# Patient Record
Sex: FEMALE | Race: WHITE | NOT HISPANIC OR LATINO | Employment: OTHER | URBAN - METROPOLITAN AREA
[De-identification: names, ages, dates, MRNs, and addresses within clinical notes are randomized per-mention and may not be internally consistent; named-entity substitution may affect disease eponyms.]

---

## 2017-09-25 ENCOUNTER — ESTABLISHED COMPREHENSIVE EXAM (OUTPATIENT)
Dept: URBAN - METROPOLITAN AREA CLINIC 40 | Facility: CLINIC | Age: 52
End: 2017-09-25

## 2017-09-25 DIAGNOSIS — H52.4: ICD-10-CM

## 2017-09-25 PROCEDURE — 92014 COMPRE OPH EXAM EST PT 1/>: CPT

## 2017-09-25 PROCEDURE — 92015 DETERMINE REFRACTIVE STATE: CPT

## 2017-09-25 ASSESSMENT — VISUAL ACUITY
OD_SC: 20/100
OS_SC: 20/100
OD_CC: 20/25-2
OS_CC: J3
OD_CC: J3
OS_CC: 20/25

## 2017-09-25 ASSESSMENT — TONOMETRY
OS_IOP_MMHG: 18
OD_IOP_MMHG: 18

## 2018-10-24 ENCOUNTER — ESTABLISHED COMPREHENSIVE EXAM (OUTPATIENT)
Dept: URBAN - METROPOLITAN AREA CLINIC 40 | Facility: CLINIC | Age: 53
End: 2018-10-24

## 2018-10-24 DIAGNOSIS — H52.4: ICD-10-CM

## 2018-10-24 DIAGNOSIS — H52.201: ICD-10-CM

## 2018-10-24 DIAGNOSIS — H52.03: ICD-10-CM

## 2018-10-24 PROCEDURE — 92014 COMPRE OPH EXAM EST PT 1/>: CPT

## 2018-10-24 PROCEDURE — 92015 DETERMINE REFRACTIVE STATE: CPT

## 2018-10-24 ASSESSMENT — VISUAL ACUITY
OD_CC: J2-
OS_CC: 20/40+2
OD_CC: 20/25-1
OU_CC: 20/25-1
OU_CC: J2+
OS_CC: J3

## 2018-10-24 ASSESSMENT — TONOMETRY
OS_IOP_MMHG: 18
OD_IOP_MMHG: 20

## 2018-10-24 ASSESSMENT — KERATOMETRY
OD_K2POWER_DIOPTERS: 44.25
OS_AXISANGLE_DEGREES: 150
OS_AXISANGLE2_DEGREES: 060
OD_AXISANGLE_DEGREES: 143
OD_K1POWER_DIOPTERS: 45.25
OD_AXISANGLE2_DEGREES: 053
OS_K2POWER_DIOPTERS: 44.75
OS_K1POWER_DIOPTERS: 45.25

## 2019-12-17 ENCOUNTER — ESTABLISHED COMPREHENSIVE EXAM (OUTPATIENT)
Dept: URBAN - METROPOLITAN AREA CLINIC 40 | Facility: CLINIC | Age: 54
End: 2019-12-17

## 2019-12-17 DIAGNOSIS — H52.201: ICD-10-CM

## 2019-12-17 DIAGNOSIS — H52.03: ICD-10-CM

## 2019-12-17 DIAGNOSIS — H52.4: ICD-10-CM

## 2019-12-17 PROCEDURE — 92499OPNC OPTOMAP RETINAL SCREENING NO CHARGE

## 2019-12-17 PROCEDURE — 92015 DETERMINE REFRACTIVE STATE: CPT

## 2019-12-17 PROCEDURE — 92014 COMPRE OPH EXAM EST PT 1/>: CPT

## 2019-12-17 ASSESSMENT — KERATOMETRY
OD_K1POWER_DIOPTERS: 46
OD_AXISANGLE_DEGREES: 147
OD_AXISANGLE2_DEGREES: 57
OS_K2POWER_DIOPTERS: 44.75
OS_K2POWER_DIOPTERS: 44.5
OD_K2POWER_DIOPTERS: 44.25
OS_AXISANGLE2_DEGREES: 060
OD_AXISANGLE2_DEGREES: 053
OS_AXISANGLE2_DEGREES: 90
OS_K1POWER_DIOPTERS: 45.25
OS_K1POWER_DIOPTERS: 44.75
OD_K1POWER_DIOPTERS: 45.25
OS_AXISANGLE_DEGREES: 150
OD_K2POWER_DIOPTERS: 44.75
OS_AXISANGLE_DEGREES: 180
OD_AXISANGLE_DEGREES: 143

## 2019-12-17 ASSESSMENT — VISUAL ACUITY
OD_CC: 20/25
OS_CC: 20/25
OU_CC: J1+
OS_CC: J2
OD_CC: J1
OU_CC: 20/20

## 2019-12-17 ASSESSMENT — TONOMETRY
OD_IOP_MMHG: 18
OS_IOP_MMHG: 18

## 2020-01-08 NOTE — PATIENT DISCUSSION
1.  Zoster ophthalmicus: The disease was discussed with the patient including the risk of contagion until the lesions have crusted and the need to avoid pregnant females who have not had chickenpox. Add (or continue) Valtrex 1gm po TID  Artificial tears prn for comfort. Monitor closely. 2. Zoster keratitis: Improving continue PRED 3x/d decrease to 2x/d on Monday can dc atropine on Monday3. Return for an appointment in 1 week for office call. with Dr. Jhonny Villanueva.

## 2020-01-16 NOTE — PATIENT DISCUSSION
1.  Zoster ophthalmicus: The disease was discussed with the patient including the risk of contagion until the lesions have crusted and the need to avoid pregnant females who have not had chickenpox. Artificial tears prn for comfort. Antibiotic ambrosio to skin lesions. Risk of inflammatory disease discussed. Monitor closely. 2. Zoster keratitis: Restart Acyclovir 400 mg 5x/d and start Viroptic 5 times Keep Scheduled appt w/ FEP if better can wait until Monday.

## 2020-01-17 NOTE — PATIENT DISCUSSION
Zoster keratitis: Continue trifluridine q2 WA for 4 days then decrease then 3x/d for a total of 7 days off steroid for nowSevere neuralgia still discomfort with gabapentin. Continue Acyclovir 400 mg 2x/dAdd Norco for 3 days. Return for an appointment in 1 week for office call. with Dr. Conrado Ruiz.

## 2020-01-21 NOTE — PATIENT DISCUSSION
Zoster keratitis: Improving on Trifluridine but some toxicity from drops. Decrease to 2x/d for 1 day then stop.h/o HSV  feels cold sore in mouth and nose. ?resistance to Acyclovir Change to Famciclovir 500 mg 3x/d for a week then qd. Some inflammation restart PRED qd. Severe postherpetic neuralgia consult pain specialist.Return for an appointment in 1 week for office call. with Dr. Betsy Faria.

## 2020-01-27 NOTE — PATIENT DISCUSSION
Zoster keratitis: Better on Famciclovir surface improving No active dendritic infection inflammation resolving PRED 2x/d tears frequent pain medication severe postherpetic neuralgia seeing pain specialist next week

## 2020-02-05 NOTE — PATIENT DISCUSSION
1.  Zoster keratitis: Inflammatory with no evidence of active viral lesions. Increase PRED to 3x/d decrease famciclovir to qd. Post herpetic neuralgia moderate recommend evaluation with pain specialist.2.  WC massage3. Return for an appointment in 2 weeks for office call. with Dr. Vania Luciano.

## 2020-02-19 NOTE — PATIENT DISCUSSION
1.  Zoster keratitis: still active Tearing diluting the effectiveness of drops. INcrease PRED to 4x/d famciclovir qd. Pt seeing pain specialist for post herpetic neuralgia. 2. Blepharitis posterior type - The patient exhibits inspissated meibomian glands. Warm compresses lid massage and lid scrubs were recommended. oral Doxycycline 50mg qd omega three/flaxseed oil supplements3. Return for an appointment in 1 month for office call. with Dr. Blil Howard. 4.  Facial Rosacea - Patient found to have skin changes consistsnt with Rosacea. No sign of ocular complications related to rosacea at this time. Consider evaluation by dermatology.

## 2020-02-19 NOTE — PATIENT DISCUSSION
Blepharitis posterior type - The patient exhibits inspissated meibomian glands. Warm compresses lid massage and lid scrubs were recommended.   oral Doxycycline 50mg qd omega three/flaxseed oil supplements

## 2020-03-18 NOTE — PATIENT DISCUSSION
Blepharitis posterior type - The patient exhibits inspissated meibomian glands. Warm compresses lid massage and lid scrubs were recommended. Antibiotic ambrosio oral Doxycycline 50mg qd omega three/flaxseed oil supplementsReturn for an appointment in 1 month for office call. with Dr. Camilla Crigler.

## 2020-03-18 NOTE — PATIENT DISCUSSION
Chalazion right lower eyelid - treatment with warm compresses and massage was recommended. Option of I& D discussed.   Schedule if desired Dr Dee Dee Guillory

## 2020-03-18 NOTE — PATIENT DISCUSSION
1.  Zoster keratitis: Quiet on PRED 4x/d slow taper a drop a month to prevent flare. PRED 3x/d2. Chalazion right lower eyelid - treatment with warm compresses and massage was recommended. Option of I& D discussed. Schedule if desired Dr Anayeli Swift. Blepharitis posterior type - The patient exhibits inspissated meibomian glands. Warm compresses lid massage and lid scrubs were recommended. Antibiotic ambrosio oral Doxycycline 50mg qd omega three/flaxseed oil supplementsReturn for an appointment in 1 month for office call. with Dr. Vania Luciano.

## 2020-04-29 NOTE — PATIENT DISCUSSION
1.  Zoster keratitis: Pt tapered off PRED too quickly restart 2x/d for a month qd for a month2. Chalazion right lower eyelid - treatment with warm compresses and massage was recommended. Option of I& D discussed. Schedule if desired Dr Bobby Huitron. Blepharitis posterior type - The patient exhibits inspissated meibomian glands. Warm compresses lid massage and lid scrubs were recommended. Antibiotic ambrosio oral Doxycycline 50mg qd omega three/flaxseed oil supplementsReturn for an appointment in 6 weeks for office call. with Dr. Theresa Segura.

## 2020-12-21 ENCOUNTER — ESTABLISHED COMPREHENSIVE EXAM (OUTPATIENT)
Dept: URBAN - METROPOLITAN AREA CLINIC 40 | Facility: CLINIC | Age: 55
End: 2020-12-21

## 2020-12-21 DIAGNOSIS — H52.4: ICD-10-CM

## 2020-12-21 DIAGNOSIS — H52.03: ICD-10-CM

## 2020-12-21 DIAGNOSIS — H52.201: ICD-10-CM

## 2020-12-21 PROCEDURE — 92014 COMPRE OPH EXAM EST PT 1/>: CPT

## 2020-12-21 PROCEDURE — 92015 DETERMINE REFRACTIVE STATE: CPT

## 2020-12-21 ASSESSMENT — VISUAL ACUITY
OS_CC: J3
OS_SC: J10
OS_CC: 20/30
OU_CC: 20/25-2
OD_SC: J10
OD_SC: 20/100
OD_CC: 20/30+1
OD_CC: J1
OS_SC: 20/100

## 2020-12-21 ASSESSMENT — KERATOMETRY
OS_AXISANGLE2_DEGREES: 060
OS_AXISANGLE_DEGREES: 150
OD_K2POWER_DIOPTERS: 44.25
OD_AXISANGLE2_DEGREES: 053
OD_AXISANGLE_DEGREES: 143
OD_K2POWER_DIOPTERS: 44.75
OS_AXISANGLE_DEGREES: 180
OS_K2POWER_DIOPTERS: 44.5
OS_K1POWER_DIOPTERS: 44.75
OD_AXISANGLE2_DEGREES: 57
OS_K1POWER_DIOPTERS: 45.25
OS_AXISANGLE2_DEGREES: 90
OS_K2POWER_DIOPTERS: 44.75
OD_K1POWER_DIOPTERS: 46
OD_K1POWER_DIOPTERS: 45.25
OD_AXISANGLE_DEGREES: 147

## 2020-12-21 ASSESSMENT — TONOMETRY
OD_IOP_MMHG: 17
OS_IOP_MMHG: 17

## 2021-02-19 NOTE — PATIENT DISCUSSION
Central Corneal Scar OD - from zosterReturn for an appointment for office call. 3-4 weeks with Dr. Kumar Vanessa.

## 2021-02-19 NOTE — PATIENT DISCUSSION
1.  Zoster keratitis: Pt non-compliant with drops and follow-up  Scarring and loss of BSCVA. Restart PRED 4x/d. Pt understands risk of permanent loss of vision if he does not follow instructionsPost herpetic neuralgia severe now seeing neurologist pain specialists unable to help further2. S/P  Lasik:3. Central Corneal Scar OD - from zosterReturn for an appointment for office call. 3-4 weeks with Dr. Jahaira Carlos.

## 2021-03-12 NOTE — PATIENT DISCUSSION
1.  Zoster keratitis: Improving slow taper of drops to prevent flare PRED 3x/d for a month then 2x/d watch IOP risk of steroid response discussed2. S/P  Lasik:3. Return for an appointment in 6 weeks for office call. with Dr. Karen Randle.

## 2021-04-23 NOTE — PATIENT DISCUSSION
1.   Zoster keratitis: Improving slow taper of drops to prevent flare PRED  2x/d for a month then qd DO NOT STOP watch IOP risk of steroid response discussed2. S/P  Lasik:3. Dry Eyes OU:  Start artificials tears. Encouraged regular use. Return for an appointment in 2 months for office call. with Dr. Concetta Bemran.

## 2021-08-20 NOTE — PATIENT DISCUSSION
1.  Zoster keratitis: stable - continue pred QD OD DO NOT STOP has flared off steroid. If flares call and increase drops2. Dry Eye OU:  Continue current management with Artificial Tears. 3.  Return for an appointment in 4 months for pressure check. with Dr. Ruth Gardner.

## 2021-12-17 NOTE — PATIENT DISCUSSION
Zoster keratitis: stable and quiet on PRED qd CPMReturn for an appointment in 4 months for office call. with Dr. Vania Luciano.

## 2021-12-17 NOTE — PATIENT DISCUSSION
1. Keratoconjunctivitis Sicca OD:  Worse seasonal allergies are flaring Increase PF tears gel. t/c regernereyes if not better. Not a candidate for restasis or xiidra. 2.  Zoster keratitis: stable and quiet on PRED qd CPMReturn for an appointment in 4 months for office call. with Dr. Mela Hayden.

## 2022-01-09 ASSESSMENT — KERATOMETRY
OD_K1POWER_DIOPTERS: 45.25
OS_AXISANGLE2_DEGREES: 060
OD_AXISANGLE_DEGREES: 147
OS_K2POWER_DIOPTERS: 44.75
OS_AXISANGLE2_DEGREES: 90
OD_K2POWER_DIOPTERS: 44.75
OD_K2POWER_DIOPTERS: 44.25
OS_AXISANGLE_DEGREES: 180
OD_AXISANGLE_DEGREES: 143
OD_K1POWER_DIOPTERS: 46
OS_K2POWER_DIOPTERS: 44.5
OD_AXISANGLE2_DEGREES: 57
OD_AXISANGLE2_DEGREES: 053
OS_AXISANGLE_DEGREES: 150
OS_K1POWER_DIOPTERS: 45.25
OS_K1POWER_DIOPTERS: 44.75

## 2022-01-10 ENCOUNTER — COMPREHENSIVE EXAM (OUTPATIENT)
Dept: URBAN - METROPOLITAN AREA CLINIC 40 | Facility: CLINIC | Age: 57
End: 2022-01-10

## 2022-01-10 DIAGNOSIS — H52.4: ICD-10-CM

## 2022-01-10 DIAGNOSIS — H52.03: ICD-10-CM

## 2022-01-10 DIAGNOSIS — H52.201: ICD-10-CM

## 2022-01-10 PROCEDURE — 92014 COMPRE OPH EXAM EST PT 1/>: CPT

## 2022-01-10 PROCEDURE — 92015 DETERMINE REFRACTIVE STATE: CPT

## 2022-01-10 ASSESSMENT — VISUAL ACUITY
OD_CC: 20/40+2
OD_SC: 20/100
OS_CC: J2
OS_SC: J10
OS_CC: 20/40
OD_CC: J2
OS_SC: 20/100
OD_SC: J10

## 2022-01-10 ASSESSMENT — TONOMETRY
OD_IOP_MMHG: 23
OS_IOP_MMHG: 19

## 2022-04-13 NOTE — PATIENT DISCUSSION
1. Keratoconjunctivitis Sicca OD:  Worse seasonal allergies are flaring Increase PF tears gel. t/c regernereyes if not better. Not a candidate for restasis or xiidra. 2.  Zoster keratitis: stable and quiet on PRED qd CPMReturn for an appointment in 4 months for office call. with Dr. Ruth Gardner.

## 2022-04-13 NOTE — PATIENT DISCUSSION
Zoster keratitis: stable and quiet on PRED qd CPMReturn for an appointment in 4 months for office call. with Dr. Jhonny Villanueva.

## 2023-01-23 ENCOUNTER — COMPREHENSIVE EXAM (OUTPATIENT)
Dept: URBAN - METROPOLITAN AREA CLINIC 40 | Facility: CLINIC | Age: 58
End: 2023-01-23

## 2023-01-23 DIAGNOSIS — H52.4: ICD-10-CM

## 2023-01-23 DIAGNOSIS — H52.201: ICD-10-CM

## 2023-01-23 DIAGNOSIS — H52.03: ICD-10-CM

## 2023-01-23 PROCEDURE — 92015 DETERMINE REFRACTIVE STATE: CPT

## 2023-01-23 PROCEDURE — 92014 COMPRE OPH EXAM EST PT 1/>: CPT

## 2023-01-23 ASSESSMENT — KERATOMETRY
OS_K1POWER_DIOPTERS: 44.75
OS_K2POWER_DIOPTERS: 44.75
OD_K1POWER_DIOPTERS: 45.25
OD_K1POWER_DIOPTERS: 46
OS_AXISANGLE_DEGREES: 150
OD_AXISANGLE_DEGREES: 147
OS_K2POWER_DIOPTERS: 44.5
OD_AXISANGLE_DEGREES: 143
OS_AXISANGLE2_DEGREES: 060
OS_AXISANGLE_DEGREES: 180
OD_K2POWER_DIOPTERS: 44.75
OD_K2POWER_DIOPTERS: 44.25
OD_AXISANGLE2_DEGREES: 57
OS_K1POWER_DIOPTERS: 45.25
OD_AXISANGLE2_DEGREES: 053
OS_AXISANGLE2_DEGREES: 90

## 2023-01-23 ASSESSMENT — VISUAL ACUITY
OU_CC: 20/20-2
OU_SC: 20/80
OD_SC: 20/100
OS_CC: 20/25
OU_SC: <J10
OU_CC: J1
OS_SC: <J10
OS_SC: 20/100
OD_CC: 20/25
OS_CC: J1
OD_CC: J1
OD_SC: <J10

## 2023-01-23 ASSESSMENT — TONOMETRY
OS_IOP_MMHG: 21
OD_IOP_MMHG: 23

## 2023-01-24 ENCOUNTER — FOLLOW UP (OUTPATIENT)
Dept: URBAN - METROPOLITAN AREA CLINIC 39 | Facility: CLINIC | Age: 58
End: 2023-01-24

## 2023-01-24 DIAGNOSIS — H52.03: ICD-10-CM

## 2023-01-24 DIAGNOSIS — H52.4: ICD-10-CM

## 2023-01-24 DIAGNOSIS — H52.201: ICD-10-CM

## 2023-01-24 PROCEDURE — 92015GRNC REFRACTION GLASSES RECHECK - NO CHARGE

## 2023-01-24 ASSESSMENT — VISUAL ACUITY
OU_CC: 20/20-2
OD_CC: J4
OD_CC: 20/30-2
OS_CC: J4
OS_CC: 20/25-2
OU_CC: J2

## 2023-01-24 ASSESSMENT — KERATOMETRY
OD_K2POWER_DIOPTERS: 44.25
OD_K1POWER_DIOPTERS: 46
OS_K2POWER_DIOPTERS: 44.5
OD_AXISANGLE_DEGREES: 147
OS_AXISANGLE_DEGREES: 150
OS_AXISANGLE2_DEGREES: 90
OD_K1POWER_DIOPTERS: 45.25
OD_AXISANGLE_DEGREES: 143
OS_AXISANGLE_DEGREES: 180
OD_AXISANGLE2_DEGREES: 57
OD_AXISANGLE2_DEGREES: 053
OS_AXISANGLE2_DEGREES: 060
OS_K1POWER_DIOPTERS: 44.75
OD_K2POWER_DIOPTERS: 44.75
OS_K1POWER_DIOPTERS: 45.25
OS_K2POWER_DIOPTERS: 44.75

## 2023-07-28 ENCOUNTER — FOLLOW UP (OUTPATIENT)
Dept: URBAN - METROPOLITAN AREA CLINIC 39 | Facility: CLINIC | Age: 58
End: 2023-07-28

## 2023-07-28 DIAGNOSIS — H40.051: ICD-10-CM

## 2023-07-28 PROCEDURE — 92133 CPTRZD OPH DX IMG PST SGM ON: CPT

## 2023-07-28 PROCEDURE — 76514 ECHO EXAM OF EYE THICKNESS: CPT

## 2023-07-28 PROCEDURE — 92012 INTRM OPH EXAM EST PATIENT: CPT

## 2023-07-28 ASSESSMENT — KERATOMETRY
OD_K1POWER_DIOPTERS: 45.25
OD_AXISANGLE2_DEGREES: 57
OS_AXISANGLE_DEGREES: 150
OS_K2POWER_DIOPTERS: 44.75
OS_AXISANGLE2_DEGREES: 060
OS_AXISANGLE_DEGREES: 180
OD_AXISANGLE_DEGREES: 143
OD_K2POWER_DIOPTERS: 44.75
OS_K2POWER_DIOPTERS: 44.5
OS_K1POWER_DIOPTERS: 45.25
OS_AXISANGLE2_DEGREES: 90
OD_AXISANGLE_DEGREES: 147
OD_AXISANGLE2_DEGREES: 053
OD_K1POWER_DIOPTERS: 46
OD_K2POWER_DIOPTERS: 44.25
OS_K1POWER_DIOPTERS: 44.75

## 2023-07-28 ASSESSMENT — PACHYMETRY
OS_CT_UM: 643
OD_CT_UM: 637

## 2023-07-28 ASSESSMENT — TONOMETRY
OS_IOP_MMHG: 17
OD_IOP_MMHG: 17

## 2023-07-28 ASSESSMENT — VISUAL ACUITY
OD_CC: 20/30+2
OS_CC: 20/25-1

## 2024-02-28 ENCOUNTER — COMPREHENSIVE EXAM (OUTPATIENT)
Dept: URBAN - METROPOLITAN AREA CLINIC 40 | Facility: CLINIC | Age: 59
End: 2024-02-28

## 2024-02-28 DIAGNOSIS — H52.201: ICD-10-CM

## 2024-02-28 DIAGNOSIS — H52.4: ICD-10-CM

## 2024-02-28 DIAGNOSIS — H52.03: ICD-10-CM

## 2024-02-28 PROCEDURE — 92015 DETERMINE REFRACTIVE STATE: CPT

## 2024-02-28 PROCEDURE — 92014 COMPRE OPH EXAM EST PT 1/>: CPT

## 2024-02-28 ASSESSMENT — TONOMETRY
OS_IOP_MMHG: 12
OD_IOP_MMHG: 12

## 2024-02-28 ASSESSMENT — VISUAL ACUITY
OS_CC: 20/25-2
OD_SC: <J12
OD_CC: J2
OD_CC: 20/30+2
OS_CC: J1
OS_SC: 20/100
OD_SC: 20/200
OS_SC: <J12

## 2024-02-28 ASSESSMENT — KERATOMETRY
OS_AXISANGLE_DEGREES: 150
OD_K2POWER_DIOPTERS: 44.25
OS_AXISANGLE2_DEGREES: 060
OD_K2POWER_DIOPTERS: 44.75
OD_AXISANGLE2_DEGREES: 57
OS_K2POWER_DIOPTERS: 44.5
OS_K2POWER_DIOPTERS: 44.75
OD_K2POWER_DIOPTERS: 43.5
OS_K2POWER_DIOPTERS: 44.0
OD_AXISANGLE2_DEGREES: 61
OD_AXISANGLE_DEGREES: 151
OD_AXISANGLE2_DEGREES: 053
OS_AXISANGLE_DEGREES: 177
OS_AXISANGLE_DEGREES: 180
OD_AXISANGLE_DEGREES: 147
OS_AXISANGLE2_DEGREES: 87
OD_K1POWER_DIOPTERS: 46
OD_K1POWER_DIOPTERS: 44.75
OD_AXISANGLE_DEGREES: 143
OS_K1POWER_DIOPTERS: 45.25
OD_K1POWER_DIOPTERS: 45.25
OS_K1POWER_DIOPTERS: 44.75
OS_AXISANGLE2_DEGREES: 90
OS_K1POWER_DIOPTERS: 45.0

## 2025-03-05 ENCOUNTER — COMPREHENSIVE EXAM (OUTPATIENT)
Age: 60
End: 2025-03-05

## 2025-03-05 DIAGNOSIS — H01.02A: ICD-10-CM

## 2025-03-05 DIAGNOSIS — H25.813: ICD-10-CM

## 2025-03-05 DIAGNOSIS — H40.051: ICD-10-CM

## 2025-03-05 DIAGNOSIS — H01.02B: ICD-10-CM

## 2025-03-05 PROCEDURE — 92014 COMPRE OPH EXAM EST PT 1/>: CPT

## 2025-03-05 PROCEDURE — 92015 DETERMINE REFRACTIVE STATE: CPT

## 2025-06-18 ENCOUNTER — EMERGENCY VISIT (OUTPATIENT)
Age: 60
End: 2025-06-18

## 2025-06-18 DIAGNOSIS — T15.12XA: ICD-10-CM

## 2025-06-18 DIAGNOSIS — S05.01XA: ICD-10-CM

## 2025-06-18 PROCEDURE — 99213 OFFICE O/P EST LOW 20 MIN: CPT

## 2025-06-18 PROCEDURE — 65210 REMOVE FOREIGN BODY FROM EYE: CPT | Mod: E1,LT

## 2025-06-18 RX ORDER — TOBRAMYCIN 3 MG/ML: 1 SOLUTION/ DROPS OPHTHALMIC
